# Patient Record
Sex: FEMALE | Race: WHITE
[De-identification: names, ages, dates, MRNs, and addresses within clinical notes are randomized per-mention and may not be internally consistent; named-entity substitution may affect disease eponyms.]

---

## 2019-08-26 ENCOUNTER — HOSPITAL ENCOUNTER (OUTPATIENT)
Age: 49
End: 2019-08-26
Payer: COMMERCIAL

## 2019-08-26 DIAGNOSIS — R20.2: Primary | ICD-10-CM

## 2019-08-26 PROCEDURE — 70553 MRI BRAIN STEM W/O & W/DYE: CPT

## 2019-08-26 PROCEDURE — 72141 MRI NECK SPINE W/O DYE: CPT

## 2019-08-26 NOTE — DI.MRI.S_ITS
PROCEDURE:  MR CERVICAL SPINE WO CON  
   
INDICATIONS:  Paresthesia of skin  
   
TECHNIQUE:    
Noncontrast sagittal T1 spin echo and T2 fast spin echo, sagittal STIR, foraminal oblique   
sagittal T2 fast spin echo, and axial gradient echo or T2 fast spin echo through the   
cervical spine.  Sagittal proton density images were also performed.    
   
COMPARISON:  [facility], MR, MR HEAD/BRAIN WO/W CON, 8/26/2019, 8:36.  
   
FINDINGS:    
Image quality:  Excellent.    
   
Alignment and Curvature:  There is normal bony alignment.    
   
Bone Marrow:  Marrow demonstrates normal overall signal.    
   
Spinal Cord:  There are several T2 hyperintense white matter lesions seen.  The most   
prominent of these can be seen on the left, centered at the C4 level.  There is a faintly   
seen lesion seen on the left at the C6 level, as on series 5 image 28.  An additional   
lesion can be faintly seen on the right at the T1 level.  
   
Visualized spinal cord has normal size.  No cerebellar tonsillar herniation.    
   
Paraspinous Soft Tissues:  No paravertebral masses.  Prevertebral soft tissues are normal   
in thickness.    
   
C2-C3:  Normal appearance.    
   
C3-C4:  Normal appearance.    
   
C4-C5:  Normal appearance.    
   
C5-C6:  Normal appearance.    
   
C6-C7:  Normal appearance.    
   
C7-T1:  Normal appearance.    
   
   
   
IMPRESSION: Several T2 hyperintense white matter lesions are seen, which are consistent   
with multiple sclerosis.  
   
No significant degenerative change is seen.  
   
   
   
Dictated by: Toñito Stevens M.D. on 8/26/2019 at 9:53       
Approved by: Toñito Stevens M.D. on 8/26/2019 at 9:57

## 2019-08-26 NOTE — DI.MRI.S_ITS
PROCEDURE:  MR HEAD/BRAIN WO/W CON  
   
INDICATIONS:  Paresthesia of skin  
   
TECHNIQUE:    
Noncontrast sagittal and axial FLAIR, axial and coronal T2 fast spin echo, axial VIBE,   
axial gradient echo, axial diffusion and ADC through the brain.  After the administration   
of contrast, axial and coronal VIBE with fat saturation through the brain.    
   
COMPARISON:  [facility], MR, MR CERVICAL SPINE WO CON, 8/26/2019, 8:13.  (No prior   
brain MRI examinations are available for review at the time of this dictation.)  
   
FINDINGS:    
Image quality:  Excellent.    
   
CSF spaces:  Ventricles are normal in size and shape.  Basal cisterns are patent.  No   
extra-axial fluid collections.    
   
Brain:  There are several areas of abnormal T2 hyperintense white matter within the   
periventricular and deep white matter.  The juxtacortical white matter is also involved.    
Several of the periventricular lesions demonstrate a perpendicular orientation to the   
lateral ventricles.  There is involvement of the corpus callosum seen.  Mild involvement   
of the pam can be seen, as on series 5 image 13.  A cerebellar lesion can be seen, as on   
series 5 image 15.  An additional potential right cerebellar lesion can be seen, as on   
series 7 image 15.  The white matter lesions do not enhance.  
   
No intracranial bleeds or mass effects.  Gray-white matter interface appears intact. No   
abnormal intracranial enhancement.  Diffusion weighted images show no acute ischemic   
insults.  Brainstem appears normal.  Normal intravascular flow voids are present.    
   
Skull and face:  Calvarial marrow signal is normal.  Orbits appear normal.    
   
Sinuses:  Sinuses and mastoids are clear.    
   
   
   
   
   
IMPRESSION:    Multiple white matter lesions are seen, with an imaging appearance that is   
highly concerning for multiple sclerosis.  
   
No prior brain MRIs are available for review at the time of this dictation.  If prior   
brain MRIs are presented for correlation, comparison will be made and an addendum issued   
to this report.  
   
No abnormal enhancement can be seen.  
   
No findings of acute or subacute infarction can be seen.  
   
   
   
Dictated by: Toñito Stevens M.D. on 8/26/2019 at 9:49       
Approved by: Toñito Stevens M.D. on 8/26/2019 at 9:53

## 2020-02-28 ENCOUNTER — HOSPITAL ENCOUNTER (OUTPATIENT)
Age: 50
End: 2020-02-28
Payer: COMMERCIAL

## 2020-02-28 DIAGNOSIS — G35: Primary | ICD-10-CM

## 2020-02-28 PROCEDURE — 72157 MRI CHEST SPINE W/O & W/DYE: CPT

## 2020-02-28 PROCEDURE — A9579 GAD-BASE MR CONTRAST NOS,1ML: HCPCS

## 2020-02-28 NOTE — DI.MRI.S_ITS
PROCEDURE:  MR THORACIC SPINE WO/W CON  
   
INDICATIONS:  MULTIPLE SCLEROSIS  
   
TECHNIQUE:    
Noncontrast sagittal T1 spin echo and T2 fast spin echo, sagittal STIR, axial T1 and T2   
fast spin echo through the thoracic spine.  After the administration of contrast, axial   
and sagittal T1 spin echo with fat saturation through the thoracic spine.    
   
COMPARISON:  None.  
   
FINDINGS:    
Image quality:  Excellent.    
   
Alignment and curvature:  There is normal bony alignment.    
   
Marrow:  Marrow is of normal overall signal.  No acute vertebral body compression   
fractures.   
   
Spinal cord:  Visualized spinal cord is of normal signal and size, without abnormal   
enhancement.    
   
Paraspinous soft tissues:  No paravertebral masses or abnormal enhancement.    
   
Miscellaneous:  Central canal and foramina appear widely patent at all scanned levels.    
   
IMPRESSION: Through the low cervical and visualized thoracic spine extending into the   
upper lumbar spine no intrinsic lesion within the spinal cord is found.  The   
contrast-enhanced portion of the study shows no evidence of cord inflammation.  The   
imaging also shows no degenerative change impinging on the cord or nerve roots.  Overall,   
normal for age.  
   
   
   
Dictated by: Bert Fine M.D. on 3/02/2020 at 8:24       
Approved by: Bert Fine M.D. on 3/02/2020 at 8:29

## 2020-10-20 ENCOUNTER — HOSPITAL ENCOUNTER (OUTPATIENT)
Age: 50
End: 2020-10-20
Payer: COMMERCIAL

## 2020-10-20 DIAGNOSIS — G35: Primary | ICD-10-CM

## 2020-10-20 PROCEDURE — 70553 MRI BRAIN STEM W/O & W/DYE: CPT

## 2020-10-20 PROCEDURE — A9579 GAD-BASE MR CONTRAST NOS,1ML: HCPCS

## 2020-10-20 PROCEDURE — 72156 MRI NECK SPINE W/O & W/DYE: CPT

## 2020-10-20 PROCEDURE — 72157 MRI CHEST SPINE W/O & W/DYE: CPT

## 2020-10-20 NOTE — DI.MRI.S_ITS
PROCEDURE:  MR CERVICAL SPINE WO/W CON  
   
INDICATIONS:  MULTIPLE SCLEROSIS  
   
TECHNIQUE:    
Noncontrast sagittal T1 spin echo and T2 fast spin echo, sagittal STIR, sagittal PD fast   
spin echo, foraminal oblique sagittal T2 fast spin echo, axial gradient echo or T2 fast   
spin echo through the cervical spine.  After the administration of contrast, sagittal and   
axial T1 spin echo with fat saturation through the cervical spine.    
   
COMPARISON:  [facility], MR, MR CERVICAL SPINE WO CON, 8/26/2019, 8:13.  [facility], MR, MR THORACIC SPINE WO/W CON, 10/20/2020, 12:57.  [facility], MR, MR   
HEAD/BRAIN WO/W CON, 10/20/2020, 12:57.  
   
FINDINGS:    
Image quality:  Excellent.    
   
Alignment and curvature:  There is normal bony alignment.    
   
Marrow:  Marrow demonstrates normal overall signal.    
   
Spinal cord:  On the left at the C4 level, there is again seen a focal white matter   
lesion, as on series 6, image 22. An additional faintly seen white matter lesion can be   
seen on the left, which is centered at the C6-C7 level, as on series 6, image 31. These   
lesions are not significantly changed over time.  No new lesions are seen.  Visualized   
spinal cord is normal in size.  No suspicious intramedullary enhancement.  No cerebellar   
tonsillar herniation.    
   
Paraspinous soft tissues:  No paravertebral masses or suspicious enhancement.    
   
C2-C3:  Normal appearance.    
   
C3-C4:  Normal appearance.    
   
C4-C5:  Normal appearance.    
   
C5-C6:  Normal appearance.    
   
C6-C7:  Normal appearance.    
   
C7-T1:  Normal appearance.    
   
   
   
IMPRESSION:    
   
Stable T2 hyperintense white matter lesions are seen, which are not significantly changed   
compared to the prior examination.  There are consistent with the given clinical history   
of multiple sclerosis.  
   
No abnormal enhancement can be seen.  
   
No significant cervical spine degenerative change can be seen.    
   
Dictated by: Toñito Stevens M.D. on 10/20/2020 at 14:38       
Approved by: Toñito Stevens M.D. on 10/20/2020 at 14:41

## 2020-10-20 NOTE — DI.MRI.S_ITS
PROCEDURE:  MR THORACIC SPINE WO/W CON  
   
INDICATIONS:  MULTIPLE SCLEROSIS  
   
TECHNIQUE:    
Noncontrast sagittal T1 spin echo and T2 fast spin echo, sagittal STIR, post-Gadolinium   
axial T1 spin echo with fat saturation through the thoracic and lumbar spines, with   
additional T2 fast spin echo and post-contrast axial T1 spin echo with fat saturation   
sequences acquired through levels of suspected cord compression.    
   
COMPARISON:  [facility], , MR THORACIC SPINE WO/W CON, 2/28/2020, 17:01.  
   
FINDINGS:    
Image quality:  Excellent.    
   
Bones:  Normal thoracic vertebral body height and alignment.  No suspicious focal marrow   
signal abnormality or bone marrow edema.  No abnormal enhancement of the osseous   
vertebral column.  No spinal canal or neural foraminal stenosis.  
   
Spinal cord:  Visualized spinal cord is normal in size and signal.  There is no T2   
hyperintense lesion to indicate a demyelinating plaque.  No abnormal intradural   
enhancement.  Normal position of the conus.  
   
Paraspinous soft tissues:  No paravertebral masses.    
   
IMPRESSION:  No evidence of demyelinating disease in the thoracic spine.  No significant   
change from comparison study.  
   
   
Dictated by: Duke Marlow M.D. on 10/20/2020 at 14:34       
Approved by: Duke Marlow M.D. on 10/20/2020 at 14:38

## 2020-10-20 NOTE — DI.MRI.S_ITS
PROCEDURE:  MR HEAD/BRAIN WO/W CON  
   
INDICATIONS:  MULTIPLE SCLEROSIS  
   
TECHNIQUE:    
Noncontrast sagittal and axial FLAIR, axial and coronal T2 fast spin echo, axial VIBE,   
axial gradient echo, axial diffusion and ADC through the brain.  After the administration   
of contrast, axial and coronal VIBE with fat saturation through the brain.    
   
COMPARISON:  [facility], , MR HEAD/BRAIN WO/W CON, 8/26/2019, 8:36.  
   
FINDINGS:    
Image quality:  Excellent.    
   
CSF spaces:  Ventricles are normal in size and shape.  Basal cisterns are patent.  No   
extra-axial fluid collections.    
   
Brain:  No intracranial bleeds or mass effects.  Gray-white matter interface appears   
intact.  No suspicious white matter lesions.  No abnormal intracranial enhancement.    
Diffusion weighted images show no acute ischemic insults.  The pattern of periventricular   
and centripetally oriented corpus callosum deep white matter lesions is stable over time,   
and no new lesion is identified.  Brainstem appears normal.  Normal intravascular flow   
voids are present.    
   
Skull and face:  Calvarial marrow signal is normal.  Orbits appear normal.    
   
Sinuses:  Sinuses and mastoids are clear.    
   
IMPRESSION:  Stable appearing multifocal bilateral periventricular and centripetally   
oriented corpus callosum white matter lesions consistent with underlying multiple   
sclerosis but free of interval worsening, contrast enhancement, or mass effect.  No   
change from 8/26/19 similar MRI.  
   
   
Dictated by: Bert Fine M.D. on 10/20/2020 at 14:35       
Approved by: Bert Fine M.D. on 10/20/2020 at 14:43

## 2021-02-11 ENCOUNTER — HOSPITAL ENCOUNTER (OUTPATIENT)
Dept: HOSPITAL 76 - DI.N | Age: 51
Discharge: HOME | End: 2021-02-11
Attending: PHYSICIAN ASSISTANT
Payer: COMMERCIAL

## 2021-02-11 DIAGNOSIS — M54.6: Primary | ICD-10-CM

## 2021-02-12 NOTE — XRAY REPORT
PROCEDURE:  Thoracic Spine 2 View

 

INDICATIONS:  THORACIC BACK PX

 

TECHNIQUE:  3 views of the thoracic spine were acquired.  

 

COMPARISON:  None.

 

FINDINGS:  

 

Bones:  No fractures or dislocations.  No suspicious bony lesions.  12 pairs of ribs are noted, and a
ppear intact where visualized.  

 

Soft tissues:  No paravertebral stripe thickening.  

 

IMPRESSION:  

This is a normal study.

 

Reviewed by: Chucky Mariano MD on 2/12/2021 8:28 AM PST

Approved by: Chucky Mariano MD on 2/12/2021 8:28 AM [facility]

 

 

Station ID:  [id number]

## 2021-03-08 ENCOUNTER — HOSPITAL ENCOUNTER (OUTPATIENT)
Age: 51
End: 2021-03-08
Payer: COMMERCIAL

## 2021-03-08 DIAGNOSIS — N31.9: Primary | ICD-10-CM

## 2021-03-08 PROCEDURE — 76770 US EXAM ABDO BACK WALL COMP: CPT

## 2021-04-12 ENCOUNTER — HOSPITAL ENCOUNTER (OUTPATIENT)
Dept: HOSPITAL 76 - LAB.R | Age: 51
Discharge: HOME | End: 2021-04-12
Attending: FAMILY MEDICINE
Payer: COMMERCIAL

## 2021-04-12 DIAGNOSIS — N39.0: Primary | ICD-10-CM

## 2021-04-12 PROCEDURE — 87181 SC STD AGAR DILUTION PER AGT: CPT

## 2021-04-12 PROCEDURE — 87086 URINE CULTURE/COLONY COUNT: CPT

## 2021-11-05 ENCOUNTER — HOSPITAL ENCOUNTER (OUTPATIENT)
Age: 51
End: 2021-11-05
Payer: COMMERCIAL

## 2021-11-05 DIAGNOSIS — G35: Primary | ICD-10-CM

## 2021-11-05 PROCEDURE — 72156 MRI NECK SPINE W/O & W/DYE: CPT

## 2021-11-05 PROCEDURE — 70553 MRI BRAIN STEM W/O & W/DYE: CPT

## 2021-11-05 PROCEDURE — A9579 GAD-BASE MR CONTRAST NOS,1ML: HCPCS

## 2022-04-01 ENCOUNTER — HOSPITAL ENCOUNTER (EMERGENCY)
Age: 52
Discharge: HOME | End: 2022-04-01
Payer: COMMERCIAL

## 2022-04-01 VITALS — BODY MASS INDEX: 32.2 KG/M2

## 2022-04-01 VITALS
HEART RATE: 98 BPM | OXYGEN SATURATION: 97 % | RESPIRATION RATE: 20 BRPM | DIASTOLIC BLOOD PRESSURE: 92 MMHG | SYSTOLIC BLOOD PRESSURE: 160 MMHG

## 2022-04-01 VITALS
DIASTOLIC BLOOD PRESSURE: 90 MMHG | RESPIRATION RATE: 21 BRPM | OXYGEN SATURATION: 98 % | SYSTOLIC BLOOD PRESSURE: 169 MMHG | HEART RATE: 91 BPM

## 2022-04-01 VITALS
SYSTOLIC BLOOD PRESSURE: 157 MMHG | DIASTOLIC BLOOD PRESSURE: 96 MMHG | HEART RATE: 105 BPM | OXYGEN SATURATION: 99 % | RESPIRATION RATE: 22 BRPM

## 2022-04-01 VITALS
DIASTOLIC BLOOD PRESSURE: 92 MMHG | HEART RATE: 101 BPM | OXYGEN SATURATION: 99 % | RESPIRATION RATE: 18 BRPM | SYSTOLIC BLOOD PRESSURE: 165 MMHG

## 2022-04-01 VITALS
DIASTOLIC BLOOD PRESSURE: 122 MMHG | OXYGEN SATURATION: 99 % | SYSTOLIC BLOOD PRESSURE: 182 MMHG | TEMPERATURE: 97.88 F | HEART RATE: 117 BPM | RESPIRATION RATE: 17 BRPM

## 2022-04-01 VITALS
RESPIRATION RATE: 21 BRPM | DIASTOLIC BLOOD PRESSURE: 92 MMHG | HEART RATE: 94 BPM | OXYGEN SATURATION: 99 % | SYSTOLIC BLOOD PRESSURE: 158 MMHG

## 2022-04-01 DIAGNOSIS — Z79.899: ICD-10-CM

## 2022-04-01 DIAGNOSIS — I10: Primary | ICD-10-CM

## 2022-04-01 LAB
ADD MANUAL DIFF / SLIDE REVIEW: NO
ALBUMIN SERPL-MCNC: 4.8 G/DL (ref 3.5–5)
ALBUMIN/GLOB SERPL: 1.2 {RATIO} (ref 1–2.8)
ALP SERPL-CCNC: 86 U/L (ref 38–126)
ALT SERPL-CCNC: 32 IU/L (ref ?–35)
BUN SERPL-MCNC: 14 MG/DL (ref 7–17)
CALCIUM SERPL-MCNC: 9.4 MG/DL (ref 8.4–10.2)
CHLORIDE SERPL-SCNC: 106 MMOL/L (ref 98–107)
CK SERPL-CCNC: 49 U/L (ref 30–135)
CKMB % RELATIVE INDEX: (no result) % (ref 1.5–5)
CO2 SERPL-SCNC: 25 MMOL/L (ref 22–32)
ESTIMATED GLOMERULAR FILT RATE: > 60 ML/MIN (ref 60–?)
GLOBULIN SER CALC-MCNC: 3.9 G/DL (ref 1.7–4.1)
GLUCOSE SERPL-MCNC: 105 MG/DL (ref 70–100)
HEMATOCRIT: 38.7 % (ref 36–46)
HEMOGLOBIN: 13.3 G/DL (ref 12–16)
HEMOLYSIS: < 15 (ref 0–50)
LIPASE SERPL-CCNC: 199 U/L (ref 23–300)
LYMPHOCYTES # SPEC AUTO: 1400 /UL (ref 1100–4500)
MCV RBC: 86.6 FL (ref 80–100)
MEAN CORPUSCULAR HEMOGLOBIN: 29.8 PG (ref 26–34)
MEAN CORPUSCULAR HGB CONC: 34.4 % (ref 30–36)
NT-PROBNP SERPL-MCNC: 26 PG/ML (ref ?–125)
PLATELET COUNT: 310 X10^3/UL (ref 150–400)
POTASSIUM SERPL-SCNC: 4.5 MMOL/L (ref 3.4–5.1)
PROT SERPL-MCNC: 8.7 G/DL (ref 6.3–8.2)
SODIUM SERPL-SCNC: 140 MMOL/L (ref 137–145)
TROPONIN I SERPL-MCNC: < 0.012 NG/ML (ref 0.01–0.03)

## 2022-04-01 PROCEDURE — 83880 ASSAY OF NATRIURETIC PEPTIDE: CPT

## 2022-04-01 PROCEDURE — 85379 FIBRIN DEGRADATION QUANT: CPT

## 2022-04-01 PROCEDURE — 96360 HYDRATION IV INFUSION INIT: CPT

## 2022-04-01 PROCEDURE — 71045 X-RAY EXAM CHEST 1 VIEW: CPT

## 2022-04-01 PROCEDURE — 82550 ASSAY OF CK (CPK): CPT

## 2022-04-01 PROCEDURE — 99284 EMERGENCY DEPT VISIT MOD MDM: CPT

## 2022-04-01 PROCEDURE — 93005 ELECTROCARDIOGRAM TRACING: CPT

## 2022-04-01 PROCEDURE — 36415 COLL VENOUS BLD VENIPUNCTURE: CPT

## 2022-04-01 PROCEDURE — 83690 ASSAY OF LIPASE: CPT

## 2022-04-01 PROCEDURE — 80053 COMPREHEN METABOLIC PANEL: CPT

## 2022-04-01 PROCEDURE — 84484 ASSAY OF TROPONIN QUANT: CPT

## 2022-04-01 PROCEDURE — 85025 COMPLETE CBC W/AUTO DIFF WBC: CPT

## 2023-08-31 ENCOUNTER — HOSPITAL ENCOUNTER (EMERGENCY)
Age: 53
LOS: 1 days | Discharge: HOME | End: 2023-09-01
Payer: COMMERCIAL

## 2023-08-31 ENCOUNTER — HOSPITAL ENCOUNTER (OUTPATIENT)
Dept: HOSPITAL 76 - EMS | Age: 53
Discharge: ANOTHER HOSPITAL | End: 2023-08-31
Payer: COMMERCIAL

## 2023-08-31 VITALS
SYSTOLIC BLOOD PRESSURE: 151 MMHG | OXYGEN SATURATION: 96 % | HEART RATE: 87 BPM | DIASTOLIC BLOOD PRESSURE: 85 MMHG | RESPIRATION RATE: 20 BRPM

## 2023-08-31 VITALS
OXYGEN SATURATION: 98 % | HEART RATE: 86 BPM | SYSTOLIC BLOOD PRESSURE: 177 MMHG | RESPIRATION RATE: 10 BRPM | DIASTOLIC BLOOD PRESSURE: 98 MMHG

## 2023-08-31 VITALS
RESPIRATION RATE: 16 BRPM | OXYGEN SATURATION: 97 % | DIASTOLIC BLOOD PRESSURE: 102 MMHG | SYSTOLIC BLOOD PRESSURE: 190 MMHG | HEART RATE: 113 BPM

## 2023-08-31 VITALS
RESPIRATION RATE: 13 BRPM | OXYGEN SATURATION: 97 % | DIASTOLIC BLOOD PRESSURE: 98 MMHG | SYSTOLIC BLOOD PRESSURE: 163 MMHG | HEART RATE: 119 BPM

## 2023-08-31 VITALS
DIASTOLIC BLOOD PRESSURE: 87 MMHG | OXYGEN SATURATION: 96 % | HEART RATE: 102 BPM | SYSTOLIC BLOOD PRESSURE: 168 MMHG | RESPIRATION RATE: 22 BRPM

## 2023-08-31 VITALS — HEART RATE: 112 BPM | OXYGEN SATURATION: 97 %

## 2023-08-31 VITALS
DIASTOLIC BLOOD PRESSURE: 94 MMHG | OXYGEN SATURATION: 95 % | RESPIRATION RATE: 26 BRPM | HEART RATE: 115 BPM | SYSTOLIC BLOOD PRESSURE: 186 MMHG | TEMPERATURE: 98.4 F

## 2023-08-31 VITALS
HEART RATE: 90 BPM | OXYGEN SATURATION: 95 % | SYSTOLIC BLOOD PRESSURE: 158 MMHG | DIASTOLIC BLOOD PRESSURE: 93 MMHG | RESPIRATION RATE: 18 BRPM

## 2023-08-31 VITALS
SYSTOLIC BLOOD PRESSURE: 186 MMHG | OXYGEN SATURATION: 95 % | HEART RATE: 113 BPM | DIASTOLIC BLOOD PRESSURE: 94 MMHG | RESPIRATION RATE: 24 BRPM

## 2023-08-31 VITALS
HEART RATE: 84 BPM | RESPIRATION RATE: 17 BRPM | SYSTOLIC BLOOD PRESSURE: 159 MMHG | DIASTOLIC BLOOD PRESSURE: 88 MMHG | OXYGEN SATURATION: 95 %

## 2023-08-31 VITALS
SYSTOLIC BLOOD PRESSURE: 139 MMHG | DIASTOLIC BLOOD PRESSURE: 76 MMHG | RESPIRATION RATE: 24 BRPM | HEART RATE: 78 BPM | OXYGEN SATURATION: 97 %

## 2023-08-31 VITALS — BODY MASS INDEX: 30.6 KG/M2

## 2023-08-31 VITALS
HEART RATE: 75 BPM | OXYGEN SATURATION: 95 % | RESPIRATION RATE: 19 BRPM | SYSTOLIC BLOOD PRESSURE: 147 MMHG | DIASTOLIC BLOOD PRESSURE: 82 MMHG

## 2023-08-31 VITALS — RESPIRATION RATE: 20 BRPM | OXYGEN SATURATION: 95 % | HEART RATE: 83 BPM

## 2023-08-31 VITALS
RESPIRATION RATE: 20 BRPM | DIASTOLIC BLOOD PRESSURE: 93 MMHG | OXYGEN SATURATION: 97 % | HEART RATE: 94 BPM | SYSTOLIC BLOOD PRESSURE: 152 MMHG

## 2023-08-31 VITALS
DIASTOLIC BLOOD PRESSURE: 98 MMHG | HEART RATE: 113 BPM | SYSTOLIC BLOOD PRESSURE: 202 MMHG | OXYGEN SATURATION: 97 % | RESPIRATION RATE: 14 BRPM

## 2023-08-31 VITALS
OXYGEN SATURATION: 97 % | DIASTOLIC BLOOD PRESSURE: 88 MMHG | SYSTOLIC BLOOD PRESSURE: 150 MMHG | RESPIRATION RATE: 17 BRPM | HEART RATE: 89 BPM

## 2023-08-31 VITALS
RESPIRATION RATE: 24 BRPM | HEART RATE: 103 BPM | DIASTOLIC BLOOD PRESSURE: 91 MMHG | SYSTOLIC BLOOD PRESSURE: 155 MMHG | OXYGEN SATURATION: 95 %

## 2023-08-31 VITALS
OXYGEN SATURATION: 97 % | RESPIRATION RATE: 24 BRPM | DIASTOLIC BLOOD PRESSURE: 92 MMHG | HEART RATE: 92 BPM | SYSTOLIC BLOOD PRESSURE: 170 MMHG

## 2023-08-31 VITALS — RESPIRATION RATE: 20 BRPM | OXYGEN SATURATION: 95 % | HEART RATE: 85 BPM

## 2023-08-31 DIAGNOSIS — R06.4: ICD-10-CM

## 2023-08-31 DIAGNOSIS — R00.0: ICD-10-CM

## 2023-08-31 DIAGNOSIS — R00.0: Primary | ICD-10-CM

## 2023-08-31 DIAGNOSIS — R07.9: ICD-10-CM

## 2023-08-31 DIAGNOSIS — R20.2: ICD-10-CM

## 2023-08-31 DIAGNOSIS — M54.9: Primary | ICD-10-CM

## 2023-08-31 DIAGNOSIS — R91.1: ICD-10-CM

## 2023-08-31 LAB
ADD MANUAL DIFF / SLIDE REVIEW: NO
ALBUMIN SERPL-MCNC: 4.3 G/DL (ref 3.5–5)
ALBUMIN/GLOB SERPL: 1.2 {RATIO} (ref 1–2.8)
ALP SERPL-CCNC: 78 U/L (ref 38–126)
ALT SERPL-CCNC: 24 IU/L (ref ?–35)
BUN SERPL-MCNC: 8 MG/DL (ref 7–17)
CALCIUM SERPL-MCNC: 9.2 MG/DL (ref 8.4–10.2)
CHLORIDE SERPL-SCNC: 109 MMOL/L (ref 98–107)
CK SERPL-CCNC: 44 U/L (ref 30–135)
CO2 SERPL-SCNC: 21 MMOL/L (ref 22–32)
ESTIMATED GLOMERULAR FILT RATE: > 60 ML/MIN (ref 60–?)
GLOBULIN SER CALC-MCNC: 3.5 G/DL (ref 1.7–4.1)
GLUCOSE SERPL-MCNC: 157 MG/DL (ref 70–100)
HEMATOCRIT: 37.5 % (ref 36–46)
HEMOGLOBIN: 12.8 G/DL (ref 12–16)
HEMOLYSIS: < 15 (ref 0–50)
INR PPP: 1.2 (ref 0.9–1.3)
LIPASE SERPL-CCNC: 382 U/L (ref 23–300)
LYMPHOCYTES # SPEC AUTO: 1500 /UL (ref 1100–4500)
MAGNESIUM SERPL-MCNC: 1.8 MG/DL (ref 1.6–2.3)
MCV RBC: 87.3 FL (ref 80–100)
MEAN CORPUSCULAR HEMOGLOBIN: 29.8 PG (ref 26–34)
MEAN CORPUSCULAR HGB CONC: 34.1 % (ref 30–36)
PLATELET COUNT: 297 X10^3/UL (ref 150–400)
POTASSIUM SERPL-SCNC: 3.3 MMOL/L (ref 3.4–5.1)
PROT SERPL-MCNC: 7.8 G/DL (ref 6.3–8.2)
PROTHROMBIN TIME: 13.3 SECONDS (ref 10.1–12.7)
PTT PARTIAL THROMBOPLASTIN TIM: 28 SECONDS (ref 26–36)
SODIUM SERPL-SCNC: 141 MMOL/L (ref 137–145)
TROPONIN I SERPL-MCNC: 0.02 NG/ML (ref 0.01–0.03)
TROPONIN I SERPL-MCNC: 0.07 NG/ML (ref 0.01–0.03)

## 2023-08-31 PROCEDURE — 99284 EMERGENCY DEPT VISIT MOD MDM: CPT

## 2023-08-31 PROCEDURE — 99285 EMERGENCY DEPT VISIT HI MDM: CPT

## 2023-08-31 PROCEDURE — 96374 THER/PROPH/DIAG INJ IV PUSH: CPT

## 2023-08-31 PROCEDURE — 85730 THROMBOPLASTIN TIME PARTIAL: CPT

## 2023-08-31 PROCEDURE — 93005 ELECTROCARDIOGRAM TRACING: CPT

## 2023-08-31 PROCEDURE — 96375 TX/PRO/DX INJ NEW DRUG ADDON: CPT

## 2023-08-31 PROCEDURE — 82550 ASSAY OF CK (CPK): CPT

## 2023-08-31 PROCEDURE — 36415 COLL VENOUS BLD VENIPUNCTURE: CPT

## 2023-08-31 PROCEDURE — 85025 COMPLETE CBC W/AUTO DIFF WBC: CPT

## 2023-08-31 PROCEDURE — 83880 ASSAY OF NATRIURETIC PEPTIDE: CPT

## 2023-08-31 PROCEDURE — 84443 ASSAY THYROID STIM HORMONE: CPT

## 2023-08-31 PROCEDURE — 84484 ASSAY OF TROPONIN QUANT: CPT

## 2023-08-31 PROCEDURE — 80053 COMPREHEN METABOLIC PANEL: CPT

## 2023-08-31 PROCEDURE — 71275 CT ANGIOGRAPHY CHEST: CPT

## 2023-08-31 PROCEDURE — 83735 ASSAY OF MAGNESIUM: CPT

## 2023-08-31 PROCEDURE — 96361 HYDRATE IV INFUSION ADD-ON: CPT

## 2023-08-31 PROCEDURE — 83690 ASSAY OF LIPASE: CPT

## 2023-08-31 PROCEDURE — 71045 X-RAY EXAM CHEST 1 VIEW: CPT

## 2023-08-31 PROCEDURE — 85610 PROTHROMBIN TIME: CPT

## 2023-09-01 VITALS — OXYGEN SATURATION: 92 % | HEART RATE: 90 BPM

## 2023-09-01 VITALS
HEART RATE: 84 BPM | DIASTOLIC BLOOD PRESSURE: 91 MMHG | SYSTOLIC BLOOD PRESSURE: 146 MMHG | RESPIRATION RATE: 22 BRPM | OXYGEN SATURATION: 95 %

## 2023-09-01 VITALS — OXYGEN SATURATION: 95 % | RESPIRATION RATE: 24 BRPM | HEART RATE: 80 BPM

## 2023-09-01 VITALS — TEMPERATURE: 97.8 F

## 2023-09-01 VITALS — OXYGEN SATURATION: 96 % | HEART RATE: 72 BPM

## 2023-09-01 LAB
NT-PROBNP SERPL-MCNC: 32 PG/ML (ref ?–125)
TROPONIN I SERPL-MCNC: 0.04 NG/ML (ref 0.01–0.03)
TSH SERPL DL<=0.05 MIU/L-ACNC: 3.57 UIU/ML (ref 0.47–4.68)

## 2024-10-06 ENCOUNTER — HOSPITAL ENCOUNTER (EMERGENCY)
Age: 54
Discharge: HOME | End: 2024-10-06
Payer: COMMERCIAL

## 2024-10-06 VITALS — DIASTOLIC BLOOD PRESSURE: 101 MMHG | SYSTOLIC BLOOD PRESSURE: 174 MMHG

## 2024-10-06 VITALS
DIASTOLIC BLOOD PRESSURE: 122 MMHG | SYSTOLIC BLOOD PRESSURE: 194 MMHG | OXYGEN SATURATION: 99 % | TEMPERATURE: 97.88 F | RESPIRATION RATE: 18 BRPM | HEART RATE: 93 BPM

## 2024-10-06 VITALS
SYSTOLIC BLOOD PRESSURE: 171 MMHG | HEART RATE: 72 BPM | OXYGEN SATURATION: 96 % | DIASTOLIC BLOOD PRESSURE: 97 MMHG | RESPIRATION RATE: 16 BRPM

## 2024-10-06 VITALS
OXYGEN SATURATION: 99 % | RESPIRATION RATE: 18 BRPM | SYSTOLIC BLOOD PRESSURE: 195 MMHG | HEART RATE: 86 BPM | DIASTOLIC BLOOD PRESSURE: 110 MMHG

## 2024-10-06 VITALS — DIASTOLIC BLOOD PRESSURE: 108 MMHG | HEART RATE: 82 BPM | SYSTOLIC BLOOD PRESSURE: 183 MMHG | OXYGEN SATURATION: 98 %

## 2024-10-06 VITALS
RESPIRATION RATE: 14 BRPM | HEART RATE: 74 BPM | OXYGEN SATURATION: 97 % | SYSTOLIC BLOOD PRESSURE: 167 MMHG | DIASTOLIC BLOOD PRESSURE: 96 MMHG

## 2024-10-06 VITALS — HEART RATE: 84 BPM | OXYGEN SATURATION: 98 %

## 2024-10-06 VITALS
HEART RATE: 69 BPM | OXYGEN SATURATION: 95 % | DIASTOLIC BLOOD PRESSURE: 96 MMHG | SYSTOLIC BLOOD PRESSURE: 171 MMHG | RESPIRATION RATE: 13 BRPM

## 2024-10-06 VITALS
DIASTOLIC BLOOD PRESSURE: 104 MMHG | RESPIRATION RATE: 14 BRPM | OXYGEN SATURATION: 98 % | HEART RATE: 76 BPM | SYSTOLIC BLOOD PRESSURE: 183 MMHG

## 2024-10-06 VITALS — BODY MASS INDEX: 29.2 KG/M2

## 2024-10-06 DIAGNOSIS — R07.9: ICD-10-CM

## 2024-10-06 DIAGNOSIS — I10: Primary | ICD-10-CM

## 2024-10-06 LAB
ADD MANUAL DIFF / SLIDE REVIEW: NO
ALBUMIN SERPL-MCNC: 4.7 G/DL (ref 3.5–5)
ALBUMIN/GLOB SERPL: 1.3 {RATIO} (ref 1–2.8)
ALP SERPL-CCNC: 91 U/L (ref 38–126)
ALT SERPL-CCNC: 21 IU/L (ref ?–35)
BUN SERPL-MCNC: 12 MG/DL (ref 7–17)
CALCIUM SERPL-MCNC: 9.9 MG/DL (ref 8.4–10.2)
CHLORIDE SERPL-SCNC: 107 MMOL/L (ref 98–107)
CK SERPL-CCNC: 66 U/L (ref 30–135)
CO2 SERPL-SCNC: 21 MMOL/L (ref 22–32)
ESTIMATED GLOMERULAR FILT RATE: > 60 ML/MIN (ref 60–?)
GLOBULIN SER CALC-MCNC: 3.5 G/DL (ref 1.7–4.1)
GLUCOSE SERPL-MCNC: 108 MG/DL (ref 70–100)
HEMATOCRIT: 40.1 % (ref 36–46)
HEMOGLOBIN: 13.8 G/DL (ref 12–16)
HEMOLYSIS: < 15 (ref 0–50)
INR PPP: 1.2 (ref 0.9–1.3)
LIPASE SERPL-CCNC: 1458 U/L (ref 23–300)
LYMPHOCYTES # SPEC AUTO: 1300 /UL (ref 1100–4500)
MAGNESIUM SERPL-MCNC: 1.9 MG/DL (ref 1.6–2.3)
MCV RBC: 91.7 FL (ref 80–100)
MEAN CORPUSCULAR HEMOGLOBIN: 31.6 PG (ref 26–34)
MEAN CORPUSCULAR HGB CONC: 34.5 % (ref 30–36)
NT-PROBNP SERPL-MCNC: 422 PG/ML (ref ?–125)
PLATELET COUNT: 260 X10^3/UL (ref 150–400)
POTASSIUM SERPL-SCNC: 4.3 MMOL/L (ref 3.4–5.1)
PROT SERPL-MCNC: 8.2 G/DL (ref 6.3–8.2)
PROTHROMBIN TIME: 13.2 SECONDS (ref 9.4–12.5)
PTT PARTIAL THROMBOPLASTIN TIM: 34 SECONDS (ref 25.1–36.5)
SODIUM SERPL-SCNC: 138 MMOL/L (ref 137–145)
TROPONIN I SERPL-MCNC: < 0.012 NG/ML (ref 0.01–0.03)

## 2024-10-06 PROCEDURE — 85025 COMPLETE CBC W/AUTO DIFF WBC: CPT

## 2024-10-06 PROCEDURE — 84484 ASSAY OF TROPONIN QUANT: CPT

## 2024-10-06 PROCEDURE — 93005 ELECTROCARDIOGRAM TRACING: CPT

## 2024-10-06 PROCEDURE — 83880 ASSAY OF NATRIURETIC PEPTIDE: CPT

## 2024-10-06 PROCEDURE — 82550 ASSAY OF CK (CPK): CPT

## 2024-10-06 PROCEDURE — 85610 PROTHROMBIN TIME: CPT

## 2024-10-06 PROCEDURE — 36415 COLL VENOUS BLD VENIPUNCTURE: CPT

## 2024-10-06 PROCEDURE — 99283 EMERGENCY DEPT VISIT LOW MDM: CPT

## 2024-10-06 PROCEDURE — 83735 ASSAY OF MAGNESIUM: CPT

## 2024-10-06 PROCEDURE — 85730 THROMBOPLASTIN TIME PARTIAL: CPT

## 2024-10-06 PROCEDURE — 71045 X-RAY EXAM CHEST 1 VIEW: CPT

## 2024-10-06 PROCEDURE — 83690 ASSAY OF LIPASE: CPT

## 2024-10-06 PROCEDURE — 80053 COMPREHEN METABOLIC PANEL: CPT

## 2024-10-06 PROCEDURE — 99284 EMERGENCY DEPT VISIT MOD MDM: CPT

## 2024-10-06 NOTE — EKG_ITS
[facility] 
                                 [address] [address] 
                                        
                                       Test Date:    2024-10-06 
Pat Name:     Halina Groves         Department:   [facility] 
Patient ID:   [id number]               Room:          
Gender:       Female                   Technician:   SHASTA 
:          1970               Requested By:  
Order Number: [id number]              Reading MD:   Sylvain Murphy MD 
                                 Measurements 
Intervals                              Axis           
Rate:         79                       P:            57 
MO:           176                      QRS:          -12 
QRSD:         92                       T:            54 
QT:           388                                     
QTc:          444                                     
                           Interpretive Statements 
Normal sinus rhythm 
Electronically Signed On 10-7-2024 7:58:46 PDT by Sylvain Murphy MD

## 2024-10-06 NOTE — DI.RAD.S_ITS
PROCEDURE:  XR CHEST 1V 
  
INDICATIONS:  chest pain 
  
TECHNIQUE:  One view of the chest was acquired.   
  
COMPARISON:  [facility], CR, XR CHEST 1V, 8/31/2023, 17:50. 
  
FINDINGS:   
  
Surgical changes and devices:  None.   
  
Lungs and pleura:  Lungs are clear.  No pleural effusions or pneumothorax.   
  
Mediastinum:  Mediastinal contours appear normal.  Heart size is normal.   
  
Bones and chest wall:  No suspicious bony lesions.  Overlying soft tissues appear  
unremarkable.   
  
  
IMPRESSION:   
  
No acute cardiopulmonary abnormality is seen.  
  
  
  
  
Approved by: Aj Barlow M.D. on 10/06/2024 at 16:09

## 2024-10-06 NOTE — ED.GENADULT
HPI - General Adult
General
Chief complaint: Hypertension
Stated complaint: HBP
Time Seen by Provider: 10/06/24 18:11
Source: patient
Mode of arrival: Family Vehicle
History of Present Illness
HPI narrative: 
Patient is a 54-year-old female who is here for evaluation of high blood pressure.  She has a history of high blood pressure.  Is on medications for blood pressure.  Does not take her blood pressure at home regularly but when she does she states it 
is normally in the 130s to 140 systolic.  She does have a slight headache but no chest pain.  No shortness of breath.  No vision changes for tingling in upper and lower extremities.  She has taken her blood pressure medications today
Related Data
Home Medications

 Medication  Instructions  Recorded  Confirmed
albuterol sulfate 90 mcg/actuation inhalation 04/01/22 
aerosol inhaler (ProAir HFA)   
amitriptyline 100 mg tablet 100 mg PO DAILY 04/01/22 04/01/22
duloxetine 60 mg capsule,delayed 60 mg PO DAILY 04/01/22 04/01/22
release   
lisinopril 20 mg tablet 20 mg PO DAILY 04/01/22 04/01/22
tolterodine 4 mg capsule,extended 4 mg PO DAILY 04/01/22 04/01/22
release 24 hr   

Previous Rx's

 Medication  Instructions  Recorded
amlodipine 5 mg tablet 5 mg PO DAILY #30 tabs 04/01/22


Allergies

Allergy/AdvReac Type Severity Reaction Status Date / Time
Penicillins Allergy Mild RASH, N/V Verified 04/01/22 14:18
Sulfa (Sulfonamide Allergy Mild RASH, N/V Verified 04/01/22 14:18
Antibiotics)     
adhesive Allergy Unknown ALLERGIC Verified 04/01/22 14:18
   TO TAPE  



Review of Systems
Review of Systems
ROS Unobtainable: All systems reviewed & are unremarkable except as noted in HPI and below

Patient History
Medical History (Reviewed 10/07/24 @ 01:42 by Shawn Rodarte DO)

Hypertension


Surgical History (Reviewed 09/01/23 @ 00:14 by Yocasta Crawford DO)

History of third molar tooth extraction
Status post surgery (05/01/15)


Social History (Reviewed 10/07/24 @ 01:42 by Shawn Rodrate DO)
Smoking Status:  Never smoker 


Smoking Status: Never smoker
alcohol intake frequency: other
Substance Use Type: does not use

Exam
Initial Vital Signs
Initial Vital Signs: 

Vital Signs

Temperature  97.9 F   10/06/24 16:41
Pulse Rate  93 H  10/06/24 16:41
Respiratory Rate  18   10/06/24 16:41
Blood Pressure  194/122 H  10/06/24 16:41
Pulse Oximetry  99   10/06/24 16:41
Oxygen Delivery Method  Room Air  10/06/24 16:41



Const
General: cooperative, comfortable and No ill appearing
HENMT
Head: normal to inspection and normocephalic
Resp
Effort & Inspection: normal respiratory effort
Auscultation: clear to auscultation bilaterally
Cardio
Rate: regular rate
Rhythm: regular rhythm
Skin
General: no rashes or lesions noted
Neuro
General: patient alert, patient awake, patient oriented x3 and moves all extremities
Extrem
General: No edema

Course
Orders
Ordered: 

ED Orders

10/06/24 16:47
XR chest 1V Stat 
EKG-12 Lead Stat 

10/06/24 17:02
Complete Blood Count AUTO DIFF Stat 
Comprehensive Metabolic Panel Stat 
Lipase Stat 
Magnesium Stat 
NT-proBNP (BNP-Adult 18+) Stat 
PTT Partial Thromboplastin Ramirez Stat 
Prothrombin Time INR Stat 
Troponin & CK Cardiac Panel Stat 




Vital Signs
Vital signs: 

Vital Signs - 8 hr

 10/06/24
18:00 10/06/24
18:00 10/06/24
18:15
Pulse Rate 69  72
Respiratory Rate 13  16
Blood Pressure  171/96 H 
Pulse Oximetry 95  96

 10/06/24
18:15
Pulse Rate 
Respiratory Rate 
Blood Pressure 171/97 H
Pulse Oximetry 




Medical Decision Making
Lab Data
Lab results reviewed: Yes I reviewed the patient's lab results.

10/06/24 17:02          

10/06/24 17:02          

Labs: 

Lab Results

  10/06/24 Range/Units
  17:02 
WBC  6.7  (4.5-11.0)  X10^3/uL
RBC  4.38  (4.0-5.2)  X10^6/uL
Hgb  13.8  (12.0-16.0)  g/dL
Hct  40.1  (36-46)  %
MCV  91.7  ()  fL
MCH  31.6  (26-34)  PG
MCHC  34.5  (30-36)  %
RDW  13.5  (11.6-14.8)  %
Plt Count  260  (150-400)  X10^3/uL
Neut % (Auto)  58.9  (50-75)  %
Lymph % (Auto)  19.3 L  (25-40)  %
Mono % (Auto)  9.5  (3-14)  %
Eos % (Auto)  11.6 H  (2-4)  %
Baso % (Auto)  0.7  (0-2)  %
Neut # (Auto)  4000  (6322-5531)  /uL
Lymph # (Auto)  1300  (5425-1610)  /uL
Mono # (Auto)  600  (0-900)  /uL
Eos # (Auto)  800 H  (0-450)  /uL
Baso # (Auto)  0  (0-100)  /uL
PT  13.2 H  (9.4-12.5)  SECONDS
INR  1.2  (0.9-1.3)  
APTT  34  (25.1-36.5)  SECONDS
Sodium  138  (137-145)  mmol/L
Potassium  4.3  (3.4-5.1)  mmol/L
Chloride  107  ()  mmol/L
Carbon Dioxide  21 L  (22-32)  mmol/L
BUN  12  (7-17)  mg/dL
Creatinine  0.69  (0.52-1.04)  mg/dL
Estimated GFR  > 60  (>60)  mL/min
BUN/Creatinine Ratio  17.4  (6-22)  
Glucose  108 H  ()  mg/dL
Calcium  9.9  (8.4-10.2)  mg/dL
Magnesium  1.9  (1.6-2.3)  mg/dL
Total Bilirubin  0.8  (0.2-1.3)  mg/dL
AST  28  (14-36)  IU/L
ALT  21  (<35)  IU/L
Alkaline Phosphatase  91  ()  U/L
Total Creatine Kinase  66  ()  U/L
Troponin I  < 0.012  (0.01-0.034)  ng/mL
NT-Pro-B Natriuret Pep  422 H  (<125)  pg/mL
Total Protein  8.2  (6.3-8.2)  g/dL
Albumin  4.7  (3.5-5.0)  g/dL
Globulin  3.5  (1.7-4.1)  g/dL
Albumin/Globulin Ratio  1.3  (1.0-2.8)  
Lipase  1458 H  ()  U/L



Imaging Data
Chest x-ray: 
      Radiologist's Impression: 
PROCEDURE:  XR CHEST 1V
 
INDICATIONS:  chest pain
 
TECHNIQUE:  One view of the chest was acquired.  
 
COMPARISON:  [facility], XR CHEST 1V, 8/31/2023, 17:50.
 
FINDINGS:  
 
Surgical changes and devices:  None.  
 
Lungs and pleura:  Lungs are clear.  No pleural effusions or pneumothorax.  
 
Mediastinum:  Mediastinal contours appear normal.  Heart size is normal.  
 
Bones and chest wall:  No suspicious bony lesions.  Overlying soft tissues appear 
unremarkable.  
 
 
IMPRESSION:  
 
No acute cardiopulmonary abnormality is seen. 
ECG Data
Attestation: I personally reviewed and interpreted this ECG as follows:
Interpretation: 
Sinus rhythm 
Ventricular rate is 79 
Normal axis 
Normal QRS
Normal QTC 
No ST T wave changes
MDM Narrative
Medical decision making narrative: 
Low suspicion for ACS, TIA, CVA, CHF, ARF, patient was not in AFib, does have history of HTN and has been taking her meds.  Had a discussion with the patient regarding her HBP, no indication for emergent lowering of the BP, will have patient talk 
with PCP about her HTN to discuss any changes to her meds.  She was given return precautions.  She expressed understanding and agreement.

Discharge Plan
Departure
Patient Disposition: Home

Clinical Impression:
 Hypertension


Instructions:  DI for High Blood Pressure

Activity Restrictions/Additional Instructions:
  Continue to take all of your medications as directed.  Take your blood pressure at home like we discussed.  contact your primary care doctor and also your cardiologist for a follow-up.  Return to the emergency department for new or worsening 
symptoms.

Prescriptions:
No Action
  tolterodine 4 mg capsule,extended release 24hr 
   4 mg PO DAILY 
  lisinopril 20 mg tablet 
   20 mg PO DAILY 
  albuterol sulfate [ProAir HFA] 90 mcg/actuation HFA aerosol inhaler 
     INHALATION  
  amitriptyline 100 mg tablet 
   100 mg PO DAILY 
  duloxetine 60 mg capsule,delayed release(DR/EC) 
   60 mg PO DAILY 
  amlodipine 5 mg tablet 
   5 mg PO DAILY Qty: 30 0RF

Referrals:
ProviderChantel [Primary Care Provider] - 

Stand Alone Forms:  Patient Portal/API

## 2024-10-06 NOTE — ED_ITS
HPI - General Adult    
General    
Chief complaint: Hypertension    
Stated complaint: HBP    
Time Seen by Provider: 10/06/24 18:11    
Source: patient    
Mode of arrival: Family Vehicle    
History of Present Illness    
HPI narrative:     
Patient is a 54-year-old female who is here for evaluation of high blood   
pressure.  She has a history of high blood pressure.  Is on medications for   
blood pressure.  Does not take her blood pressure at home regularly but when she  
does she states it is normally in the 130s to 140 systolic.  She does have a   
slight headache but no chest pain.  No shortness of breath.  No vision changes   
for tingling in upper and lower extremities.  She has taken her blood pressure   
medications today    
Related Data    
                                Home Medications    
    
    
    
 Medication  Instructions  Recorded  Confirmed    
     
albuterol sulfate 90 mcg/actuation inhalation 04/01/22     
    
aerosol inhaler (ProAir HFA)       
     
amitriptyline 100 mg tablet 100 mg PO DAILY 04/01/22 04/01/22    
     
duloxetine 60 mg capsule,delayed 60 mg PO DAILY 04/01/22 04/01/22    
    
release       
     
lisinopril 20 mg tablet 20 mg PO DAILY 04/01/22 04/01/22    
     
tolterodine 4 mg capsule,extended 4 mg PO DAILY 04/01/22 04/01/22    
    
release 24 hr       
    
    
                                  Previous Rx's    
    
    
    
 Medication  Instructions  Recorded    
     
amlodipine 5 mg tablet 5 mg PO DAILY #30 tabs 04/01/22    
    
    
    
                                    Allergies    
    
    
    
Allergy/AdvReac Type Severity Reaction Status Date / Time    
     
Penicillins Allergy Mild RASH, N/V Verified 04/01/22 14:18    
     
Sulfa (Sulfonamide Allergy Mild RASH, N/V Verified 04/01/22 14:18    
    
Antibiotics)         
     
adhesive Allergy Unknown ALLERGIC Verified 04/01/22 14:18    
    
   TO TAPE      
    
    
    
    
Review of Systems    
Review of Systems    
ROS Unobtainable: All systems reviewed & are unremarkable except as noted in HPI  
and below    
    
Patient History    
Medical History (Reviewed 10/07/24 @ 01:42 by Shawn Rodarte DO)    
    
Hypertension    
    
    
Surgical History (Reviewed 09/01/23 @ 00:14 by Yocasta Crawford DO)    
    
History of third molar tooth extraction    
Status post surgery (05/01/15)    
    
    
Social History (Reviewed 10/07/24 @ 01:42 by Shawn Rodarte DO)    
Smoking Status:  Never smoker     
    
    
Smoking Status: Never smoker    
alcohol intake frequency: other    
Substance Use Type: does not use    
    
Exam    
Initial Vital Signs    
Initial Vital Signs:     
    
Vital Signs    
    
    
    
Temperature  97.9 F   10/06/24 16:41    
     
Pulse Rate  93 H  10/06/24 16:41    
     
Respiratory Rate  18   10/06/24 16:41    
     
Blood Pressure  194/122 H  10/06/24 16:41    
     
Pulse Oximetry  99   10/06/24 16:41    
     
Oxygen Delivery Method  Room Air  10/06/24 16:41    
    
    
    
    
Const    
General: cooperative, comfortable and No ill appearing    
HENMT    
Head: normal to inspection and normocephalic    
Resp    
Effort & Inspection: normal respiratory effort    
Auscultation: clear to auscultation bilaterally    
Cardio    
Rate: regular rate    
Rhythm: regular rhythm    
Skin    
General: no rashes or lesions noted    
Neuro    
General: patient alert, patient awake, patient oriented x3 and moves all   
extremities    
Extrem    
General: No edema    
    
Course    
Orders    
Ordered:     
    
                                    ED Orders    
    
10/06/24 16:47    
XR chest 1V Stat     
EKG-12 Lead Stat     
    
10/06/24 17:02    
Complete Blood Count AUTO DIFF Stat     
Comprehensive Metabolic Panel Stat     
Lipase Stat     
Magnesium Stat     
NT-proBNP (BNP-Adult 18+) Stat     
PTT Partial Thromboplastin Ramirez Stat     
Prothrombin Time INR Stat     
Troponin & CK Cardiac Panel Stat     
    
    
    
    
Vital Signs    
Vital signs:     
    
                               Vital Signs - 8 hr    
    
    
    
 10/06/24    
18:00 10/06/24    
18:00 10/06/24    
18:15    
     
Pulse Rate 69  72    
     
Respiratory Rate 13  16    
     
Blood Pressure  171/96 H     
     
Pulse Oximetry 95  96    
    
    
    
    
 10/06/24    
18:15    
     
Pulse Rate     
     
Respiratory Rate     
     
Blood Pressure 171/97 H    
     
Pulse Oximetry     
    
    
    
    
    
Medical Decision Making    
Lab Data    
Lab results reviewed: Yes I reviewed the patient's lab results.    
    
                                                        10/06/24 17:02              
    
                                                        10/06/24 17:02              
    
Labs:     
    
                                   Lab Results    
    
    
    
  10/06/24 Range/Units    
    
  17:02     
     
WBC  6.7  (4.5-11.0)  X10^3/uL    
     
RBC  4.38  (4.0-5.2)  X10^6/uL    
     
Hgb  13.8  (12.0-16.0)  g/dL    
     
Hct  40.1  (36-46)  %    
     
MCV  91.7  ()  fL    
     
MCH  31.6  (26-34)  PG    
     
MCHC  34.5  (30-36)  %    
     
RDW  13.5  (11.6-14.8)  %    
     
Plt Count  260  (150-400)  X10^3/uL    
     
Neut % (Auto)  58.9  (50-75)  %    
     
Lymph % (Auto)  19.3 L  (25-40)  %    
     
Mono % (Auto)  9.5  (3-14)  %    
     
Eos % (Auto)  11.6 H  (2-4)  %    
     
Baso % (Auto)  0.7  (0-2)  %    
     
Neut # (Auto)  4000  (6664-3852)  /uL    
     
Lymph # (Auto)  1300  (2571-3456)  /uL    
     
Mono # (Auto)  600  (0-900)  /uL    
     
Eos # (Auto)  800 H  (0-450)  /uL    
     
Baso # (Auto)  0  (0-100)  /uL    
     
PT  13.2 H  (9.4-12.5)  SECONDS    
     
INR  1.2  (0.9-1.3)      
     
APTT  34  (25.1-36.5)  SECONDS    
     
Sodium  138  (137-145)  mmol/L    
     
Potassium  4.3  (3.4-5.1)  mmol/L    
     
Chloride  107  ()  mmol/L    
     
Carbon Dioxide  21 L  (22-32)  mmol/L    
     
BUN  12  (7-17)  mg/dL    
     
Creatinine  0.69  (0.52-1.04)  mg/dL    
     
Estimated GFR  > 60  (>60)  mL/min    
     
BUN/Creatinine Ratio  17.4  (6-22)      
     
Glucose  108 H  ()  mg/dL    
     
Calcium  9.9  (8.4-10.2)  mg/dL    
     
Magnesium  1.9  (1.6-2.3)  mg/dL    
     
Total Bilirubin  0.8  (0.2-1.3)  mg/dL    
     
AST  28  (14-36)  IU/L    
     
ALT  21  (<35)  IU/L    
     
Alkaline Phosphatase  91  ()  U/L    
     
Total Creatine Kinase  66  ()  U/L    
     
Troponin I  < 0.012  (0.01-0.034)  ng/mL    
     
NT-Pro-B Natriuret Pep  422 H  (<125)  pg/mL    
     
Total Protein  8.2  (6.3-8.2)  g/dL    
     
Albumin  4.7  (3.5-5.0)  g/dL    
     
Globulin  3.5  (1.7-4.1)  g/dL    
     
Albumin/Globulin Ratio  1.3  (1.0-2.8)      
     
Lipase  1458 H  ()  U/L    
    
    
    
    
Imaging Data    
Chest x-ray:     
      Radiologist's Impression:     
PROCEDURE:  XR CHEST 1V    
     
INDICATIONS:  chest pain    
     
TECHNIQUE:  One view of the chest was acquired.      
     
COMPARISON:  [facility], XR CHEST 1V, 8/31/2023, 17:50.    
     
FINDINGS:      
     
Surgical changes and devices:  None.      
     
Lungs and pleura:  Lungs are clear.  No pleural effusions or pneumothorax.      
     
Mediastinum:  Mediastinal contours appear normal.  Heart size is normal.      
     
Bones and chest wall:  No suspicious bony lesions.  Overlying soft tissues   
appear     
unremarkable.      
     
     
IMPRESSION:      
     
No acute cardiopulmonary abnormality is seen.     
ECG Data    
Attestation: I personally reviewed and interpreted this ECG as follows:    
Interpretation:     
Sinus rhythm     
Ventricular rate is 79     
Normal axis     
Normal QRS    
Normal QTC     
No ST T wave changes    
MDM Narrative    
Medical decision making narrative:     
Low suspicion for ACS, TIA, CVA, CHF, ARF, patient was not in AFib, does have   
history of HTN and has been taking her meds.  Had a discussion with the patient   
regarding her HBP, no indication for emergent lowering of the BP, will have   
patient talk with PCP about her HTN to discuss any changes to her meds.  She was  
given return precautions.  She expressed understanding and agreement.    
    
Discharge Plan    
Departure    
Patient Disposition: Home    
    
Clinical Impression:    
 Hypertension    
    
    
Instructions:  DI for High Blood Pressure    
    
Activity Restrictions/Additional Instructions:    
  Continue to take all of your medications as directed.  Take your blood   
pressure at home like we discussed.  contact your primary care doctor and also   
your cardiologist for a follow-up.  Return to the emergency department for new   
or worsening symptoms.    
    
Prescriptions:    
No Action    
  tolterodine 4 mg capsule,extended release 24hr     
   4 mg PO DAILY     
  lisinopril 20 mg tablet     
   20 mg PO DAILY     
  albuterol sulfate [ProAir HFA] 90 mcg/actuation HFA aerosol inhaler     
     INHALATION      
  amitriptyline 100 mg tablet     
   100 mg PO DAILY     
  duloxetine 60 mg capsule,delayed release(DR/EC)     
   60 mg PO DAILY     
  amlodipine 5 mg tablet     
   5 mg PO DAILY Qty: 30 0RF    
    
Referrals:    
ProviderChantel [Primary Care Provider] -     
    
Stand Alone Forms:  Patient Portal/API